# Patient Record
Sex: MALE | Race: WHITE | Employment: FULL TIME | URBAN - METROPOLITAN AREA
[De-identification: names, ages, dates, MRNs, and addresses within clinical notes are randomized per-mention and may not be internally consistent; named-entity substitution may affect disease eponyms.]

---

## 2023-03-17 ENCOUNTER — PREP FOR PROCEDURE (OUTPATIENT)
Dept: SURGERY | Age: 48
End: 2023-03-17

## 2023-03-17 ENCOUNTER — HOSPITAL ENCOUNTER (OUTPATIENT)
Dept: MRI IMAGING | Age: 48
Discharge: HOME | End: 2023-03-20

## 2023-03-17 ENCOUNTER — OFFICE VISIT (OUTPATIENT)
Dept: SURGERY | Age: 48
End: 2023-03-17
Payer: COMMERCIAL

## 2023-03-17 VITALS
BODY MASS INDEX: 25.62 KG/M2 | SYSTOLIC BLOOD PRESSURE: 110 MMHG | HEART RATE: 84 BPM | DIASTOLIC BLOOD PRESSURE: 80 MMHG | OXYGEN SATURATION: 98 % | WEIGHT: 179 LBS | HEIGHT: 70 IN

## 2023-03-17 DIAGNOSIS — K80.65 CALCULUS OF GALLBLADDER AND BILE DUCT WITH CHRONIC CHOLECYSTITIS WITH OBSTRUCTION: Primary | ICD-10-CM

## 2023-03-17 DIAGNOSIS — R79.89 ELEVATED LFTS: ICD-10-CM

## 2023-03-17 DIAGNOSIS — K83.8 DILATED CBD, ACQUIRED: ICD-10-CM

## 2023-03-17 DIAGNOSIS — K80.65 CALCULUS OF GALLBLADDER AND BILE DUCT WITH CHRONIC CHOLECYSTITIS WITH OBSTRUCTION: ICD-10-CM

## 2023-03-17 PROBLEM — K80.20 CHOLELITHIASIS: Status: ACTIVE | Noted: 2023-03-17

## 2023-03-17 PROCEDURE — 99204 OFFICE O/P NEW MOD 45 MIN: CPT | Performed by: SURGERY

## 2023-03-17 RX ORDER — SODIUM CHLORIDE 0.9 % (FLUSH) 0.9 %
5-40 SYRINGE (ML) INJECTION PRN
Status: CANCELLED | OUTPATIENT
Start: 2023-03-17

## 2023-03-17 RX ORDER — ARIPIPRAZOLE 5 MG/1
2.5 TABLET ORAL DAILY
COMMUNITY
Start: 2023-02-23

## 2023-03-17 RX ORDER — INDOCYANINE GREEN AND WATER 25 MG
2.5 KIT INJECTION ONCE
Status: CANCELLED | OUTPATIENT
Start: 2023-03-17 | End: 2023-03-17

## 2023-03-17 RX ORDER — SODIUM CHLORIDE 0.9 % (FLUSH) 0.9 %
5-40 SYRINGE (ML) INJECTION EVERY 12 HOURS SCHEDULED
Status: CANCELLED | OUTPATIENT
Start: 2023-03-17

## 2023-03-17 RX ORDER — SODIUM CHLORIDE 9 MG/ML
INJECTION, SOLUTION INTRAVENOUS PRN
Status: CANCELLED | OUTPATIENT
Start: 2023-03-17

## 2023-03-17 RX ORDER — SERTRALINE HYDROCHLORIDE 100 MG/1
TABLET, FILM COATED ORAL
COMMUNITY
Start: 2023-02-25

## 2023-03-17 NOTE — PROGRESS NOTES
[address]  [phone]    Office Note/Consult Note   Zaheer Nichols   MRN: [id number]     : 1975        HPI: Zaheer Nichols is a 49 y.o. male who is seen in the office on 3/17/2023, on urgent referral from Dr. Irving Chavez for second opinion and evaluation of cholelithiasis with h/o intermittent epigastric pain. He was originally evaluated in late February by his PCP. He was having epigastric pain at the base of his breast bone. It had been occurring on and off for 1-2 years every few months. Intense but goes away. It would hurt so bad he would be curled up in a fetal position and sweat. Some episodes have been severe. It is happening more frequently. No associations. It can happen anytime but not associated with any activity or meal. It can awaken him at night. No CP or SOB. No worse with activity. He states he does feel exhausted when it happens. Eats OK. BM are normal.  Father had to have total gastrectomy years ago. Patient has had a normal colonoscopy and EGD a couple of years ago. One spot with out radiation and it is a sharp burning pain. He no longer has acid reflux symptoms. It seems unassociated with that. No increased pain with exercise, taking deep breath or movement. He has had evaluation with blood work and abdominal ultrasound. At that visit he had blood work that showed elevated transaminases that had been previously normal which returned to near normal 3 weeks later. Ultrasound identified cholelithiasis without mention of gallbladder wall thickness or other signs of cholecystitis. They identified some abnormalities of the common duct and possible changes consistent with mild pancreatitis. At the same time his lipase level was nonelevated, and amylase was mildly elevated. He was given referrals to GI and general surgery. He has yet to be seen by GI.   He was seen by general surgery who recommended cholecystectomy with intraoperative cholangiogram.  Of note, he mentions intermittent episodes of brown urine, acholic stools even as recent as earlier this week. He has no history of hepatitis. Lab work showed normal bilirubin and urinalysis showed no evidence of bile within the urine. He was set up to undergo cholecystectomy in [place], but prefers to have his surgery performed in [place] while also obtaining a second opinion. He has no history of prior abdominal surgeries. He does have a history of pneumothorax treated with tube thoracostomy. He lives near [facility], but works as a  in the [place] area. Past Medical History:   Diagnosis Date    Anxiety      History reviewed. No pertinent surgical history. Current Outpatient Medications   Medication Sig    sertraline (ZOLOFT) 100 MG tablet TAKE 1 TABLET BY MOUTH EVERY DAY    ARIPiprazole (ABILIFY) 5 MG tablet Take 2.5 mg by mouth daily     No current facility-administered medications for this visit. ALLERGIES:  Patient has no known allergies. Social History     Socioeconomic History    Marital status:      Spouse name: None    Number of children: None    Years of education: None    Highest education level: None   Tobacco Use    Smoking status: Never    Smokeless tobacco: Never     Social History     Tobacco Use   Smoking Status Never   Smokeless Tobacco Never     History reviewed. No pertinent family history. ROS: The patient has no difficulty with chest pain or shortness of breath. No fever or chills. The patient denies any personal or family history of abnormal clotting or bleeding. Comprehensive review of systems was otherwise unremarkable except as noted above. Physical Exam:   Constitutional: Alert oriented cooperative patient in no acute distress. /80   Pulse 84   Ht 5' 10\" (1.778 m)   Wt 179 lb (81.2 kg)   SpO2 98%   BMI 25.68 kg/m²   Eyes:Sclera are clear without icterus. ENMT: no obvious neck masses, no ear or lip lesions  CV: RRR. Normal perfusion  Resp: No JVD. Breathing is  non-labored. GI: soft and non-distended, nontender, no palpable RUQ, LUQ or epigastric mass, negative Velasquez's sign     Musculoskeletal: unremarkable with normal function. Neuro:  No obvious focal deficits  Psychiatric: normal affect and mood, no memory impairment    No results found for: WBC, WBCLT, HGB, HGBP, HCT, PHCT, PLT, MCV unremarkable    No results found for: NA, K, CL, CO2, BUN, CREATININE, GLUCOSE, CALCIUM, PROT, LABALBU, BILITOT, ALKPHOS, AST, ALT, LABGLOM, GFRAA, AGRATIO, GLOB unremarkable except as shown below      No results found for: AMYLASE 184 on 3/14/2023  No results found for: LIPASE 35 nonelevated on 3/16/2023    LIVER STUDIES: 3/13/2023 and 2/21/2023        US ABDOMEN  RIGHT UPPER QUADRANT  3/2/2023 @[facility]    REASON FOR EXAM:   Right upper quadrant pain     TECHNIQUE:   Sonographic imaging through the abdomen. COMPARISON:   None     FINDINGS:   Liver: Unremarkable. Portal vein direction of flow is normal on color Doppler evaluation. Pancreas: Normal size and contour, with increased echogenicity suggesting the possibility of previous pancreatitis. Please correlate clinically. Gallbladder: Cholelithiasis. Negative sonographic Velasquez's sign. Common Duct: No evidence of ductal dilation. Right Kidney: 10.1 cm in length. 2.4 cm central cyst, possibly parapelvic. No hydronephrosis or mass. Aorta/IVC: No seen well enough for a proper diagnostic evaluation. IMPRESSION:     Cholelithiasis. Negative sonographic Velasquez's sign. Questionable previous pancreatitis. Central cyst right kidney. Addendum by Garrett Inman MD on 03/02/2023  2:10 PM EST   ADDENDUM:   There is mild dilatation of the common bile duct, without intrahepatic   biliary ductal dilatation.   There is mild color Doppler activity within   the duct in some of the images, with no convincing evidence of   choledocholithiasis. MRCP may be of help in further evaluation. Assessment/Plan:     Tabitha Ball is a 49 y.o. male who presents to the office on 3/17/2023. His findings are consistent with cholelithiasis with passing of gallstones. He has been recommended to undergo cholecystectomy with intraoperative cholangiogram.  He has been having symptoms for approximately 2 years. Blood work back in 2021 showed elevated alkaline phosphatase with normal bilirubin and transaminases. Blood work in February showed elevation of transaminases which normalized within 4 weeks. Total bilirubin and alkaline phosphatase remain nonelevated. Lipase measurement is nonelevated. 3 days earlier, amylase measurement is mildly elevated. He describes episodes of dark urine and acholic stool. Urinalysis did not identify bilirubinuria. Ultrasound identified cholelithiasis without abnormalities of the gallbladder. There were some abnormalities and mild dilation of the common bile duct. Some changes were identified in the pancreas that could represent prior pancreatitis. He does not appear jaundiced today. Blood work from yesterday shows completely normal LFTs. However, his symptoms of dark urine and acholic stools have been off and on. It is possible that he is having recurrent mild bouts of pancreatitis with periampullary edema responsible for these chemistry and symptom changes. He could also have choledocholithiasis. I recommend that he undergo cholecystectomy. We will obtain an MRI/MRCP today for additional evaluation of his common bile duct and pancreas prior to surgery. He understands that if choledocholithiasis is found, then we may refer him to GI for ERCP and duct clearance. Otherwise we discussed proceeding with multiport robotic assisted cholecystectomy with possible intraoperative cholangiogram, possible open. We will utilize ICG for real-time immunofluorescent cholangiography. He does not have a history of traumatic pneumothorax many years ago. I discussed the patient's condition and treatment options with the patient. I discussed risks of surgery in language the patient could understand including bleeding, infection, bile leak, bile duct injury, retained stones, pancreatitis, hernia, chronic pain, chronic nausea, fecal urgency, chronic diarrhea, food intolerances, need for further surgery, abscess, fistula, SBO, DVT, PE, heart attack, stroke, renal failure, respiratory failure, ventilatory dependence, and death. The patient voiced understanding of all this and all questions were answered. Alternatives to surgery were discussed also and risks of the alternatives. The patient requested that we proceed with surgery. Informed consent was obtained. A copy of this note is sent to the referring physician. Patient Active Problem List    Diagnosis Date Noted    Calculus of gallbladder and bile duct with chronic cholecystitis with obstruction 03/17/2023     Priority: Medium    Dilated cbd, acquired 03/17/2023     Priority: Medium    Elevated LFTs 03/17/2023     Priority: Medium    Cholelithiasis 03/17/2023     Added automatically from request for surgery [id number]          Level of MDM (Based on 2/3 elements below)  Number and Complexity of Problems Addressed Amount and/or Complexity of Data to be Reviewed and Analyzed  *Each unique test, order, or document contributes to the combination of 2 or combination of 3 in Category 1 below. Risk of Complications and/or Morbidity or Mortality of pt Management     18932  80288 SF Minimal  ?1self-limited or minor problem Minimal or none Minimal risk of morbidity from additional diagnostic testing or Rx   81197  13503 Low Low  ? 2or more self-limited or minor problems;    or  ? 1stable chronic illness;    or  ?1acute, uncomplicated illness or injury   Limited  (Must meet the requirements of at least 1 of the 2 categories)  Category 1: Tests and documents   ? Any combination of 2 from the following:  ?Review of prior external note(s) from each unique source*;  ?review of the result(s) of each unique test*;   ?ordering of each unique test*    or   Category 2: Assessment requiring an independent historian(s)  (For the categories of independent interpretation of tests and discussion of management or test interpretation, see moderate or high) Low risk of morbidity from additional diagnostic testing or treatment     41852  89045 Mod Moderate  ? 1or more chronic illnesses with exacerbation, progression, or side effects of treatment;    or  ?2or more stable chronic illnesses;    or      1undiagnosed new problem with uncertain prognosis;    or  ?1acute illness with systemic symptoms;    or  ?1acute complicated injury   Moderate:  (Must meet the requirements of 1 out of 3 categories)    Category 1: Tests, documents, or independent historian(s)  ? Any combination of 3 from the following:   ?Review of prior external note(s) from each unique source*;  ?Review of the result(s) of each unique test*;  ?Ordering of each unique test*;  ?Assessment requiring an independent historian(s)    or  Category 2: Independent interpretation of tests   ? Independent interpretation of a test performed by another physician/other qualified health care professional (not separately reported);     or  Category 3: Discussion of management or test interpretation  ? Discussion of management or test interpretation with external physician/other qualified health care professional/appropriate source (not separately reported)   Moderate risk of morbidity from additional diagnostic testing or treatment  Examples only: ? ? Prescription drug management - including advanced wound care prescription wound care products  (eg Iodosorb, hydrofera, silvadene, collagen, puracol plus, optiloc, biologics - placenta, Theraskin, Apligraf).   Note also that if home health care is involved, they will not apply topical therapies without a prescription/order from physician    ? Decision regarding minor surgery with identified patient or procedure risk factors  ? Decision regarding elective major surgery without identified patient or procedure risk factors   ? Diagnosis or treatment significantly limited by social determinants of health       08119  52952 High High  ? 1or more chronic illnesses with severe exacerbation, progression, or side effects of treatment;    or  ?1 acute or chronic illness or injury that poses a threat to life or bodily function   Extensive  (Must meet the requirements of at least 2 out of 3 categories)    Category 1: Tests, documents, or independent historian(s)  ? Any combination of 3 from the following:   ?Review of prior external note(s) from each unique source*;  ?Review of the result(s) of each unique test*;   ?Ordering of each unique test*;   ?Assessment requiring an independent historian(s)    or   Category 2: Independent interpretation of tests   ? Independent interpretation of a test performed by another physician/other qualified health care professional (not separately reported);     or  Category 3: Discussion of management or test interpretation  ? Discussion of management or test interpretation with external physician/other qualified health care professional/appropriate source (not separately reported)   High risk of morbidity from additional diagnostic testing or treatment  Examples only:  ?Drug therapy requiring intensive monitoring for toxicity  ? Decision regarding elective major surgery with identified patient or procedure risk factors  ? Decision regarding emergency major surgery  ? Decision regarding hospitalization  ? Decision not to resuscitate or to de-escalate care because of poor prognosis      Parenteral controlled substances             Gin Otoole MD,  FACS

## 2023-03-17 NOTE — PROGRESS NOTES
Patient verified name and . Order for consent not found in EHR and matches case posting; patient verifies procedure. Type 2 surgery, phone assessment complete. Orders not received. Labs per surgeon: none  Labs per anesthesia protocol: Hgb    Patient answered medical/surgical history questions at their best of ability. All prior to admission medications documented in EPIC. Patient instructed to take the following medications the day of surgery according to anesthesia guidelines with a small sip of water: Zoloft, Abilify On the day before surgery please take Acetaminophen 1000mg in the morning and then again before bed. You may substitute for Tylenol 650 mg. Hold all vitamins 7 days prior to surgery and NSAIDS 5 days prior to surgery. Prescription meds to hold:none  Patient instructed on the following:    > Arrive at main Entrance, time of arrival to be called the day before by 1700  > NPO after midnight, unless otherwise indicated, including gum, mints, and ice chips  > Responsible adult must drive patient to the hospital, stay during surgery, and patient will need supervision 24 hours after anesthesia  > Use Dial in shower the night before surgery and on the morning of surgery  > All piercings must be removed prior to arrival.    > Leave all valuables (money and jewelry) at home but bring insurance card and ID on DOS.   > You may be required to pay a deductible or co-pay on the day of your procedure. You can pre-pay by calling [phone] if your surgery is at the [facility] or [phone] if your surgery is at the [facility]. > Do not wear make-up, nail polish, lotions, cologne, perfumes, powders, or oil on skin. Artificial nails are not permitted.

## 2023-04-04 DIAGNOSIS — K80.65 CALCULUS OF GALLBLADDER AND BILE DUCT WITH CHRONIC CHOLECYSTITIS WITH OBSTRUCTION: Primary | ICD-10-CM

## 2023-04-05 ENCOUNTER — ANESTHESIA (OUTPATIENT)
Dept: SURGERY | Age: 48
End: 2023-04-05
Payer: COMMERCIAL

## 2023-04-05 ENCOUNTER — CLINICAL DOCUMENTATION (OUTPATIENT)
Dept: SURGERY | Age: 48
End: 2023-04-05

## 2023-04-05 ENCOUNTER — HOSPITAL ENCOUNTER (OUTPATIENT)
Age: 48
Setting detail: OUTPATIENT SURGERY
Discharge: HOME | End: 2023-04-05
Attending: SURGERY | Admitting: SURGERY
Payer: COMMERCIAL

## 2023-04-05 ENCOUNTER — APPOINTMENT (OUTPATIENT)
Dept: GENERAL RADIOLOGY | Age: 48
End: 2023-04-05
Attending: SURGERY
Payer: COMMERCIAL

## 2023-04-05 ENCOUNTER — ANESTHESIA EVENT (OUTPATIENT)
Dept: SURGERY | Age: 48
End: 2023-04-05
Payer: COMMERCIAL

## 2023-04-05 VITALS
RESPIRATION RATE: 16 BRPM | TEMPERATURE: 97.6 F | HEIGHT: 70 IN | SYSTOLIC BLOOD PRESSURE: 149 MMHG | WEIGHT: 179.5 LBS | HEART RATE: 77 BPM | BODY MASS INDEX: 25.7 KG/M2 | OXYGEN SATURATION: 96 % | DIASTOLIC BLOOD PRESSURE: 85 MMHG

## 2023-04-05 DIAGNOSIS — K80.67 CALCULUS OF GALLBLADDER AND BILE DUCT WITH ACUTE ON CHRONIC CHOLECYSTITIS, WITH OBSTRUCTION: Primary | ICD-10-CM

## 2023-04-05 DIAGNOSIS — K80.65 CALCULUS OF GALLBLADDER AND BILE DUCT WITH CHRONIC CHOLECYSTITIS WITH OBSTRUCTION: Primary | ICD-10-CM

## 2023-04-05 DIAGNOSIS — K80.65 CALCULUS OF GALLBLADDER AND BILE DUCT WITH CHRONIC CHOLECYSTITIS WITH OBSTRUCTION: ICD-10-CM

## 2023-04-05 PROCEDURE — 7100000001 HC PACU RECOVERY - ADDTL 15 MIN: Performed by: SURGERY

## 2023-04-05 PROCEDURE — 6370000000 HC RX 637 (ALT 250 FOR IP): Performed by: ANESTHESIOLOGY

## 2023-04-05 PROCEDURE — 2500000003 HC RX 250 WO HCPCS: Performed by: STUDENT IN AN ORGANIZED HEALTH CARE EDUCATION/TRAINING PROGRAM

## 2023-04-05 PROCEDURE — 6360000002 HC RX W HCPCS: Performed by: STUDENT IN AN ORGANIZED HEALTH CARE EDUCATION/TRAINING PROGRAM

## 2023-04-05 PROCEDURE — 6360000002 HC RX W HCPCS: Performed by: ANESTHESIOLOGY

## 2023-04-05 PROCEDURE — 7100000000 HC PACU RECOVERY - FIRST 15 MIN: Performed by: SURGERY

## 2023-04-05 PROCEDURE — S2900 ROBOTIC SURGICAL SYSTEM: HCPCS | Performed by: SURGERY

## 2023-04-05 PROCEDURE — 3600000019 HC SURGERY ROBOT ADDTL 15MIN: Performed by: SURGERY

## 2023-04-05 PROCEDURE — 7100000011 HC PHASE II RECOVERY - ADDTL 15 MIN: Performed by: SURGERY

## 2023-04-05 PROCEDURE — 2709999900 HC NON-CHARGEABLE SUPPLY: Performed by: SURGERY

## 2023-04-05 PROCEDURE — 2580000003 HC RX 258: Performed by: STUDENT IN AN ORGANIZED HEALTH CARE EDUCATION/TRAINING PROGRAM

## 2023-04-05 PROCEDURE — 7100000010 HC PHASE II RECOVERY - FIRST 15 MIN: Performed by: SURGERY

## 2023-04-05 PROCEDURE — 74300 X-RAY BILE DUCTS/PANCREAS: CPT

## 2023-04-05 PROCEDURE — 2580000003 HC RX 258: Performed by: ANESTHESIOLOGY

## 2023-04-05 PROCEDURE — 3600000009 HC SURGERY ROBOT BASE: Performed by: SURGERY

## 2023-04-05 PROCEDURE — 3700000000 HC ANESTHESIA ATTENDED CARE: Performed by: SURGERY

## 2023-04-05 PROCEDURE — 2500000003 HC RX 250 WO HCPCS: Performed by: SURGERY

## 2023-04-05 PROCEDURE — 88304 TISSUE EXAM BY PATHOLOGIST: CPT

## 2023-04-05 PROCEDURE — 3700000001 HC ADD 15 MINUTES (ANESTHESIA): Performed by: SURGERY

## 2023-04-05 PROCEDURE — 6360000002 HC RX W HCPCS: Performed by: NURSE ANESTHETIST, CERTIFIED REGISTERED

## 2023-04-05 PROCEDURE — 6360000002 HC RX W HCPCS: Performed by: SURGERY

## 2023-04-05 RX ORDER — ONDANSETRON 2 MG/ML
INJECTION INTRAMUSCULAR; INTRAVENOUS PRN
Status: DISCONTINUED | OUTPATIENT
Start: 2023-04-05 | End: 2023-04-05 | Stop reason: SDUPTHER

## 2023-04-05 RX ORDER — SODIUM CHLORIDE 9 MG/ML
INJECTION, SOLUTION INTRAVENOUS PRN
Status: DISCONTINUED | OUTPATIENT
Start: 2023-04-05 | End: 2023-04-05 | Stop reason: HOSPADM

## 2023-04-05 RX ORDER — OXYCODONE HYDROCHLORIDE 5 MG/1
5 TABLET ORAL PRN
Status: COMPLETED | OUTPATIENT
Start: 2023-04-05 | End: 2023-04-05

## 2023-04-05 RX ORDER — LIDOCAINE HYDROCHLORIDE 20 MG/ML
INJECTION, SOLUTION EPIDURAL; INFILTRATION; INTRACAUDAL; PERINEURAL PRN
Status: DISCONTINUED | OUTPATIENT
Start: 2023-04-05 | End: 2023-04-05 | Stop reason: SDUPTHER

## 2023-04-05 RX ORDER — SODIUM CHLORIDE 0.9 % (FLUSH) 0.9 %
5-40 SYRINGE (ML) INJECTION EVERY 12 HOURS SCHEDULED
Status: DISCONTINUED | OUTPATIENT
Start: 2023-04-05 | End: 2023-04-05 | Stop reason: HOSPADM

## 2023-04-05 RX ORDER — OXYCODONE HYDROCHLORIDE 5 MG/1
10 TABLET ORAL PRN
Status: COMPLETED | OUTPATIENT
Start: 2023-04-05 | End: 2023-04-05

## 2023-04-05 RX ORDER — NEOSTIGMINE METHYLSULFATE 1 MG/ML
INJECTION, SOLUTION INTRAVENOUS PRN
Status: DISCONTINUED | OUTPATIENT
Start: 2023-04-05 | End: 2023-04-05 | Stop reason: SDUPTHER

## 2023-04-05 RX ORDER — ROCURONIUM BROMIDE 10 MG/ML
INJECTION, SOLUTION INTRAVENOUS PRN
Status: DISCONTINUED | OUTPATIENT
Start: 2023-04-05 | End: 2023-04-05 | Stop reason: SDUPTHER

## 2023-04-05 RX ORDER — HYDROMORPHONE HCL 110MG/55ML
PATIENT CONTROLLED ANALGESIA SYRINGE INTRAVENOUS PRN
Status: DISCONTINUED | OUTPATIENT
Start: 2023-04-05 | End: 2023-04-05 | Stop reason: SDUPTHER

## 2023-04-05 RX ORDER — HYDROMORPHONE HYDROCHLORIDE 2 MG/ML
0.5 INJECTION, SOLUTION INTRAMUSCULAR; INTRAVENOUS; SUBCUTANEOUS EVERY 10 MIN PRN
Status: DISCONTINUED | OUTPATIENT
Start: 2023-04-05 | End: 2023-04-05 | Stop reason: HOSPADM

## 2023-04-05 RX ORDER — ONDANSETRON 4 MG/1
4 TABLET, ORALLY DISINTEGRATING ORAL EVERY 6 HOURS PRN
Qty: 30 TABLET | Refills: 1 | Status: SHIPPED | OUTPATIENT
Start: 2023-04-05

## 2023-04-05 RX ORDER — PROPOFOL 10 MG/ML
INJECTION, EMULSION INTRAVENOUS PRN
Status: DISCONTINUED | OUTPATIENT
Start: 2023-04-05 | End: 2023-04-05 | Stop reason: SDUPTHER

## 2023-04-05 RX ORDER — ONDANSETRON 2 MG/ML
4 INJECTION INTRAMUSCULAR; INTRAVENOUS
Status: COMPLETED | OUTPATIENT
Start: 2023-04-05 | End: 2023-04-05

## 2023-04-05 RX ORDER — HYDROMORPHONE HYDROCHLORIDE 2 MG/ML
0.25 INJECTION, SOLUTION INTRAMUSCULAR; INTRAVENOUS; SUBCUTANEOUS EVERY 5 MIN PRN
Status: DISCONTINUED | OUTPATIENT
Start: 2023-04-05 | End: 2023-04-05 | Stop reason: HOSPADM

## 2023-04-05 RX ORDER — MIDAZOLAM HYDROCHLORIDE 2 MG/2ML
2 INJECTION, SOLUTION INTRAMUSCULAR; INTRAVENOUS
Status: COMPLETED | OUTPATIENT
Start: 2023-04-05 | End: 2023-04-05

## 2023-04-05 RX ORDER — HYDROCODONE BITARTRATE AND ACETAMINOPHEN 7.5; 325 MG/1; MG/1
1 TABLET ORAL EVERY 6 HOURS PRN
Qty: 20 TABLET | Refills: 0 | Status: SHIPPED | OUTPATIENT
Start: 2023-04-05 | End: 2023-04-10

## 2023-04-05 RX ORDER — DIPHENHYDRAMINE HYDROCHLORIDE 50 MG/ML
12.5 INJECTION INTRAMUSCULAR; INTRAVENOUS
Status: DISCONTINUED | OUTPATIENT
Start: 2023-04-05 | End: 2023-04-05 | Stop reason: HOSPADM

## 2023-04-05 RX ORDER — EPHEDRINE SULFATE/0.9% NACL/PF 50 MG/5 ML
SYRINGE (ML) INTRAVENOUS PRN
Status: DISCONTINUED | OUTPATIENT
Start: 2023-04-05 | End: 2023-04-05 | Stop reason: SDUPTHER

## 2023-04-05 RX ORDER — FENTANYL CITRATE 50 UG/ML
100 INJECTION, SOLUTION INTRAMUSCULAR; INTRAVENOUS
Status: DISCONTINUED | OUTPATIENT
Start: 2023-04-05 | End: 2023-04-05 | Stop reason: HOSPADM

## 2023-04-05 RX ORDER — DEXAMETHASONE SODIUM PHOSPHATE 4 MG/ML
INJECTION, SOLUTION INTRA-ARTICULAR; INTRALESIONAL; INTRAMUSCULAR; INTRAVENOUS; SOFT TISSUE PRN
Status: DISCONTINUED | OUTPATIENT
Start: 2023-04-05 | End: 2023-04-05 | Stop reason: SDUPTHER

## 2023-04-05 RX ORDER — INDOCYANINE GREEN AND WATER 25 MG
2.5 KIT INJECTION ONCE
Status: COMPLETED | OUTPATIENT
Start: 2023-04-05 | End: 2023-04-05

## 2023-04-05 RX ORDER — TRAMADOL HYDROCHLORIDE 50 MG/1
50 TABLET ORAL EVERY 4 HOURS PRN
Qty: 18 TABLET | Refills: 0 | Status: SHIPPED | OUTPATIENT
Start: 2023-04-05 | End: 2023-04-05 | Stop reason: CLARIF

## 2023-04-05 RX ORDER — GLYCOPYRROLATE 0.2 MG/ML
INJECTION INTRAMUSCULAR; INTRAVENOUS PRN
Status: DISCONTINUED | OUTPATIENT
Start: 2023-04-05 | End: 2023-04-05 | Stop reason: SDUPTHER

## 2023-04-05 RX ORDER — SODIUM CHLORIDE, SODIUM LACTATE, POTASSIUM CHLORIDE, CALCIUM CHLORIDE 600; 310; 30; 20 MG/100ML; MG/100ML; MG/100ML; MG/100ML
INJECTION, SOLUTION INTRAVENOUS CONTINUOUS
Status: DISCONTINUED | OUTPATIENT
Start: 2023-04-05 | End: 2023-04-05 | Stop reason: HOSPADM

## 2023-04-05 RX ORDER — SODIUM CHLORIDE 0.9 % (FLUSH) 0.9 %
5-40 SYRINGE (ML) INJECTION PRN
Status: DISCONTINUED | OUTPATIENT
Start: 2023-04-05 | End: 2023-04-05 | Stop reason: HOSPADM

## 2023-04-05 RX ORDER — ACETAMINOPHEN 500 MG
1000 TABLET ORAL ONCE
Status: COMPLETED | OUTPATIENT
Start: 2023-04-05 | End: 2023-04-05

## 2023-04-05 RX ADMIN — PROPOFOL 150 MG: 10 INJECTION, EMULSION INTRAVENOUS at 10:45

## 2023-04-05 RX ADMIN — ONDANSETRON 4 MG: 2 INJECTION INTRAMUSCULAR; INTRAVENOUS at 12:28

## 2023-04-05 RX ADMIN — LIDOCAINE HYDROCHLORIDE 100 MG: 20 INJECTION, SOLUTION EPIDURAL; INFILTRATION; INTRACAUDAL; PERINEURAL at 10:45

## 2023-04-05 RX ADMIN — ACETAMINOPHEN 1000 MG: 500 TABLET, FILM COATED ORAL at 09:29

## 2023-04-05 RX ADMIN — FENTANYL CITRATE 50 MCG: 50 INJECTION INTRAMUSCULAR; INTRAVENOUS at 11:01

## 2023-04-05 RX ADMIN — ROCURONIUM BROMIDE 10 MG: 50 INJECTION, SOLUTION INTRAVENOUS at 11:46

## 2023-04-05 RX ADMIN — ROCURONIUM BROMIDE 50 MG: 50 INJECTION, SOLUTION INTRAVENOUS at 10:45

## 2023-04-05 RX ADMIN — Medication 2000 MG: at 10:55

## 2023-04-05 RX ADMIN — PHENYLEPHRINE HYDROCHLORIDE 100 MCG: 10 INJECTION INTRAVENOUS at 11:11

## 2023-04-05 RX ADMIN — PHENYLEPHRINE HYDROCHLORIDE 100 MCG: 10 INJECTION INTRAVENOUS at 11:28

## 2023-04-05 RX ADMIN — MIDAZOLAM 2 MG: 1 INJECTION INTRAMUSCULAR; INTRAVENOUS at 09:43

## 2023-04-05 RX ADMIN — Medication 5 MG: at 11:34

## 2023-04-05 RX ADMIN — DEXAMETHASONE SODIUM PHOSPHATE 10 MG: 4 INJECTION, SOLUTION INTRAMUSCULAR; INTRAVENOUS at 11:15

## 2023-04-05 RX ADMIN — SODIUM CHLORIDE, POTASSIUM CHLORIDE, SODIUM LACTATE AND CALCIUM CHLORIDE: 600; 310; 30; 20 INJECTION, SOLUTION INTRAVENOUS at 09:29

## 2023-04-05 RX ADMIN — ONDANSETRON 4 MG: 2 INJECTION INTRAMUSCULAR; INTRAVENOUS at 14:59

## 2023-04-05 RX ADMIN — HYDROMORPHONE HYDROCHLORIDE 0.4 MG: 2 INJECTION INTRAMUSCULAR; INTRAVENOUS; SUBCUTANEOUS at 13:04

## 2023-04-05 RX ADMIN — FENTANYL CITRATE 50 MCG: 50 INJECTION INTRAMUSCULAR; INTRAVENOUS at 10:45

## 2023-04-05 RX ADMIN — OXYCODONE 10 MG: 5 TABLET ORAL at 13:49

## 2023-04-05 RX ADMIN — INDOCYANINE GREEN AND WATER 2.5 MG: KIT at 09:30

## 2023-04-05 RX ADMIN — GLYCOPYRROLATE 0.8 MG: 0.2 INJECTION INTRAMUSCULAR; INTRAVENOUS at 12:39

## 2023-04-05 RX ADMIN — Medication 5 MG: at 12:39

## 2023-04-05 ASSESSMENT — PAIN - FUNCTIONAL ASSESSMENT
PAIN_FUNCTIONAL_ASSESSMENT: 0-10
PAIN_FUNCTIONAL_ASSESSMENT: 0-10
PAIN_FUNCTIONAL_ASSESSMENT: ADULT NONVERBAL PAIN SCALE (NPVS)
PAIN_FUNCTIONAL_ASSESSMENT: 0-10

## 2023-04-05 ASSESSMENT — PAIN DESCRIPTION - LOCATION: LOCATION: ABDOMEN

## 2023-04-05 ASSESSMENT — PAIN SCALES - GENERAL: PAINLEVEL_OUTOF10: 6

## 2023-04-05 NOTE — DISCHARGE INSTRUCTIONS
experiencing discomfort from urinary retention, please go to the nearest ED. If you have sleep apnea and have a CPAP machine, please use it for all naps and sleeping. Please use caution when taking narcotics and any of your home medications that may cause drowsiness. *  Please give a list of your current medications to your Primary Care Provider. *  Please update this list whenever your medications are discontinued, doses are      changed, or new medications (including over-the-counter products) are added. *  Please carry medication information at all times in case of emergency situations. These are general instructions for a healthy lifestyle:  No smoking/ No tobacco products/ Avoid exposure to second hand smoke  Surgeon General's Warning:  Quitting smoking now greatly reduces serious risk to your health. Obesity, smoking, and sedentary lifestyle greatly increases your risk for illness  A healthy diet, regular physical exercise & weight monitoring are important for maintaining a healthy lifestyle    You may be retaining fluid if you have a history of heart failure or if you experience any of the following symptoms:  Weight gain of 3 pounds or more overnight or 5 pounds in a week, increased swelling in our hands or feet or shortness of breath while lying flat in bed. Please call your doctor as soon as you notice any of these symptoms; do not wait until your next office visit.

## 2023-04-05 NOTE — ANESTHESIA PROCEDURE NOTES
Airway  Date/Time: 4/5/2023 10:52 AM  Urgency: elective    Airway not difficult    General Information and Staff    Patient location during procedure: OR  Resident/CRNA: JESSIE Dunn - CRNA  Performed: resident/CRNA     Indications and Patient Condition  Indications for airway management: anesthesia  Spontaneous Ventilation: absent  Sedation level: deep  Preoxygenated: yes  Patient position: sniffing  MILS not maintained throughout  Mask difficulty assessment: vent by bag mask    Final Airway Details  Final airway type: endotracheal airway      Successful airway: ETT  Cuffed: yes   Successful intubation technique: video laryngoscopy  Facilitating devices/methods: intubating stylet  Endotracheal tube insertion site: oral  Blade: Shira  Blade size: #4  ETT size (mm): 8.0  Cormack-Lehane Classification: grade I - full view of glottis  Placement verified by: chest auscultation and capnometry   Measured from: lips  ETT to lips (cm): 22  Number of attempts at approach: 1  Ventilation between attempts: bag mask  Number of other approaches attempted: 1    Other Attempts  Unsuccessful attempted airways: bag mask  Unsuccessful attempted endotracheal techniques: direct laryngoscopy    Additional Comments  SRNA DL with MAC4l, grade III view, on second attempt SRNA attempted video laryngoscope and successfully placed 8. 0ETT confirmed by etCO2  no
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
## 2023-04-05 NOTE — ANESTHESIA POSTPROCEDURE EVALUATION
Department of Anesthesiology  Postprocedure Note    Patient: Mirta Burrows  MRN: [id number]  YOB: 1975  Date of evaluation: 4/5/2023      Procedure Summary     Date: 04/05/23 Room / Location: [facility] MAIN OR  / [facility] MAIN OR    Anesthesia Start: 1037 Anesthesia Stop: 9565    Procedure: CHOLECYSTECTOMY LAPAROSCOPIC ROBOTIC WITH CHOLANGIOGRAM (Abdomen) Diagnosis:       Cholelithiasis      (Cholelithiasis [K80.20])    Providers: Eunice Swift MD Responsible Provider: Oswaldo Jenkins MD    Anesthesia Type: General ASA Status: 2          Anesthesia Type: General    Jaycee Phase I: Jaycee Score: 9    Jaycee Phase II: Jaycee Score: 10      Anesthesia Post Evaluation    Patient location during evaluation: PACU  Patient participation: complete - patient participated  Level of consciousness: awake and alert  Pain score: 2  Airway patency: patent  Nausea & Vomiting: no nausea  Complications: no  Cardiovascular status: blood pressure returned to baseline and hemodynamically stable  Respiratory status: acceptable  Hydration status: euvolemic  Multimodal analgesia pain management approach  There was medical reason for not using a multimodal analgesia pain management approach.

## 2023-04-05 NOTE — H&P
extracted; small ampulla limited papillotomy size but able to pass 12 mm balloon  Pancreas not evaluated  GB - stones; normal cystic duct    PLAN:  GB surgery  F/U prn    Roby Herzog MD       Assessment/Plan:     Nabil Coates is a 51 y.o. male who presents to the office on 3/17/2023. His findings are consistent with cholelithiasis with passing of gallstones. He has been recommended to undergo cholecystectomy with intraoperative cholangiogram.  He has been having symptoms for approximately 2 years. Blood work back in 2021 showed elevated alkaline phosphatase with normal bilirubin and transaminases. Blood work in February showed elevation of transaminases which normalized within 4 weeks. Total bilirubin and alkaline phosphatase remain nonelevated. Lipase measurement is nonelevated. 3 days earlier, amylase measurement is mildly elevated. He describes episodes of dark urine and acholic stool. Urinalysis did not identify bilirubinuria. Ultrasound identified cholelithiasis without abnormalities of the gallbladder. There were some abnormalities and mild dilation of the common bile duct. Some changes were identified in the pancreas that could represent prior pancreatitis. He does not appear jaundiced today. Blood work from yesterday shows completely normal LFTs. However, his symptoms of dark urine and acholic stools have been off and on. It is possible that he is having recurrent mild bouts of pancreatitis with periampullary edema responsible for these chemistry and symptom changes. He could also have choledocholithiasis. I recommend that he undergo cholecystectomy. We will obtain an MRI/MRCP today for additional evaluation of his common bile duct and pancreas prior to surgery. He understands that if choledocholithiasis is found, then we may refer him to GI for ERCP and duct clearance. MRCP was obtained which identified choledocholithiasis.   He underwent ERCP with stone extraction and

## 2023-04-05 NOTE — PERIOP NOTE
Patient has spoken with GALDINO WRIGHT. Consent has been verified, signed and witnessed by this RN. 2 mg of Versed given SIVP per orders. Pulse ox monitor in place & tracing appropriately. Bed in low, locked position with side rails up x 2 and call light in reach. Instructed pt to call with any needs and to remain in bed. Verbalizes understanding. Wife at bedside.

## 2023-04-05 NOTE — BRIEF OP NOTE
Brief Postoperative Note      Patient: Wiley Arroyo  YOB: 1975  MRN: [id number]    Date of Procedure: 4/5/2023    Pre-Op Diagnosis: Cholelithiasis with chronic cholecystitis and choledocholithiasis s/p ERCP with CBD stone extraction    Post-Op Diagnosis: Same and acute cholecystitis with obstruction of cystic duct       Procedure(s):  MULTIPORT ROBOTIC LAPAROSCOPIC CHOLECYSTECTOMY WITH INTRAOPERATIVE CHOLANGIOGRAM AND ICG CHOLANGIOGRAPHY    Surgeon(s):  Kristin Richmond MD    Assistant:  * No surgical staff found *    Anesthesia: General    Estimated Blood Loss (mL): Minimal    Complications: None    Specimens:   ID Type Source Tests Collected by Time Destination   A : gallbladder Tissue Gallbladder SURGICAL PATHOLOGY Kristin Richmond MD 4/5/2023 1110        Implants:  * No implants in log *      Drains:   [REMOVED] Urinary Catheter 04/05/23 2 Way; Stock (Removed)       Findings: Mild hepatic steatosis, otherwise normal appearing liver. Very pale GB without uptake of ICG. Cystic duct also without uptake of ICG. Very edematous tissues c/w acute inflammation on top of chronic inflammation. Critical view obtained. CBD/CHD well seen with ICG. No accessory ducts identified. Cystic duct opened and no backflow of bile/ICG. Cystic duct cannulated and saline flush cleared duct producing backflow of bile and ICG. Cholangiogram obtained showing prompt flow of contrast into duodenum via sphincterotomy. Filling defects of either small stones or air bubbles. Flow into hepatic ducts identified but could not fill due to sphincterotomy and 10 degrees of reverse Trendelenburg. Cholecystectomy completed with very thick GB wall and edema. GB opened on back table revealing many medium and large stones, pale bile, thickened and edematous GB wall, normal anatomy. M-close x 2.     Electronically signed by Kristin Richmond MD on 4/5/2023 at 1:28 PM

## 2023-04-07 NOTE — OP NOTE
cholecystectomy. TECHNIQUE: The cystic duct has been cannulated and contrast injected. Dynamic images are reviewed. FINDINGS: Small intraluminal filling defects within the common bile duct identified on initial images. Intrahepatic biliary grossly normal caliber. Contrast flows freely into the duodenum     Impression   Small intraluminal filling defects, gas bubbles or small stones. Detailed Description of Procedure: The risks, benefits, potential complications, treatment options, and expected outcomes were discussed with the patient pre-operatively. The patient voiced understanding and gave informed consent preoperatively. The patient was taken to the Operating Room, and the [facility] time-out protocol checklist was followed. After the induction of adequate anesthesia, the abdomen was prepped and draped in the usual sterile fashion. Preoperative antibiotics were given. An orogastric tube was in place. Stock catheter was in place. Both arms were out. After infiltration with local anesthetic, a supraumbilical 8 mm vertical incision was created and the abdominal wall elevated to allow entry into the abdominal cavity with Veress needle. Confirmation of placement within the peritoneal cavity was performed using saline drop test.  CO2 pneumoperitoneum was insufflated at 1 L/min with an opening pressure of 0 to a pressure limit of 15 mmHg. Using a 8 mm daVinci Optiview port with the 0 degree 5 mm laparoscope, first trocar access was obtained in the left abdomen. Laparoscopic confirmation showed no deep injury or adhesions. There was no bleeding. Other robotic ports were placed. The same procedure was followed for each port with infiltration of local anesthetic, incision corresponding to size of the trocar and placement of the trocar under laparoscopic visualization. A 8 mm trocar was placed supraumbilically, 2 x 8 mm trocars were placed in the left lateral abdomen approximately 5 cm apart.   A

## 2023-04-10 DIAGNOSIS — K80.65 CALCULUS OF GALLBLADDER AND BILE DUCT WITH CHRONIC CHOLECYSTITIS WITH OBSTRUCTION: ICD-10-CM

## 2023-04-10 LAB
ALBUMIN SERPL-MCNC: 3.6 G/DL (ref 3.5–5)
ALBUMIN/GLOB SERPL: 0.8 (ref 0.4–1.6)
ALP SERPL-CCNC: 134 U/L (ref 50–136)
ALT SERPL-CCNC: 46 U/L (ref 12–65)
ANION GAP SERPL CALC-SCNC: NORMAL MMOL/L (ref 2–11)
AST SERPL-CCNC: 23 U/L (ref 15–37)
BASOPHILS # BLD: 0 K/UL (ref 0–0.2)
BASOPHILS NFR BLD: 1 % (ref 0–2)
BILIRUB SERPL-MCNC: 0.2 MG/DL (ref 0.2–1.1)
BUN SERPL-MCNC: 10 MG/DL (ref 6–23)
CALCIUM SERPL-MCNC: 9.2 MG/DL (ref 8.3–10.4)
CHLORIDE SERPL-SCNC: 105 MMOL/L (ref 101–110)
CO2 SERPL-SCNC: 32 MMOL/L (ref 21–32)
CREAT SERPL-MCNC: 0.8 MG/DL (ref 0.8–1.5)
DIFFERENTIAL METHOD BLD: NORMAL
EOSINOPHIL # BLD: 0.2 K/UL (ref 0–0.8)
EOSINOPHIL NFR BLD: 3 % (ref 0.5–7.8)
ERYTHROCYTE [DISTWIDTH] IN BLOOD BY AUTOMATED COUNT: 13 % (ref 11.9–14.6)
GLOBULIN SER CALC-MCNC: 4.3 G/DL (ref 2.8–4.5)
GLUCOSE SERPL-MCNC: 83 MG/DL (ref 65–100)
HCT VFR BLD AUTO: 44.4 % (ref 41.1–50.3)
HGB BLD-MCNC: 14.6 G/DL (ref 13.6–17.2)
IMM GRANULOCYTES # BLD AUTO: 0.1 K/UL (ref 0–0.5)
IMM GRANULOCYTES NFR BLD AUTO: 1 % (ref 0–5)
LYMPHOCYTES # BLD: 1.5 K/UL (ref 0.5–4.6)
LYMPHOCYTES NFR BLD: 19 % (ref 13–44)
MCH RBC QN AUTO: 28 PG (ref 26.1–32.9)
MCHC RBC AUTO-ENTMCNC: 32.9 G/DL (ref 31.4–35)
MCV RBC AUTO: 85.1 FL (ref 82–102)
MONOCYTES # BLD: 0.5 K/UL (ref 0.1–1.3)
MONOCYTES NFR BLD: 6 % (ref 4–12)
NEUTS SEG # BLD: 5.6 K/UL (ref 1.7–8.2)
NEUTS SEG NFR BLD: 70 % (ref 43–78)
NRBC # BLD: 0 K/UL (ref 0–0.2)
PLATELET # BLD AUTO: 286 K/UL (ref 150–450)
PMV BLD AUTO: 9.6 FL (ref 9.4–12.3)
POTASSIUM SERPL-SCNC: 4.1 MMOL/L (ref 3.5–5.1)
PROT SERPL-MCNC: 7.9 G/DL (ref 6.3–8.2)
RBC # BLD AUTO: 5.22 M/UL (ref 4.23–5.6)
SODIUM SERPL-SCNC: 136 MMOL/L (ref 133–143)
WBC # BLD AUTO: 7.9 K/UL (ref 4.3–11.1)

## 2023-04-18 ENCOUNTER — OFFICE VISIT (OUTPATIENT)
Dept: SURGERY | Age: 48
End: 2023-04-18

## 2023-04-18 VITALS — BODY MASS INDEX: 25.91 KG/M2 | WEIGHT: 181 LBS | HEIGHT: 70 IN

## 2023-04-18 DIAGNOSIS — R79.89 ELEVATED LFTS: ICD-10-CM

## 2023-04-18 DIAGNOSIS — K83.8 DILATED CBD, ACQUIRED: ICD-10-CM

## 2023-04-18 DIAGNOSIS — K80.67 CALCULUS OF GALLBLADDER AND BILE DUCT WITH ACUTE ON CHRONIC CHOLECYSTITIS, WITH OBSTRUCTION: Primary | ICD-10-CM

## 2023-04-18 PROCEDURE — 99024 POSTOP FOLLOW-UP VISIT: CPT | Performed by: SURGERY

## 2025-07-30 ENCOUNTER — APPOINTMENT (OUTPATIENT)
Dept: GENERAL RADIOLOGY | Age: 50
End: 2025-07-30
Payer: COMMERCIAL

## 2025-07-30 ENCOUNTER — APPOINTMENT (OUTPATIENT)
Dept: CT IMAGING | Age: 50
End: 2025-07-30
Payer: COMMERCIAL

## 2025-07-30 ENCOUNTER — HOSPITAL ENCOUNTER (EMERGENCY)
Age: 50
Discharge: HOME | End: 2025-07-30
Payer: COMMERCIAL

## 2025-07-30 VITALS
HEIGHT: 70 IN | BODY MASS INDEX: 30.78 KG/M2 | WEIGHT: 215 LBS | TEMPERATURE: 98.4 F | OXYGEN SATURATION: 96 % | SYSTOLIC BLOOD PRESSURE: 128 MMHG | HEART RATE: 75 BPM | DIASTOLIC BLOOD PRESSURE: 79 MMHG | RESPIRATION RATE: 27 BRPM

## 2025-07-30 DIAGNOSIS — R07.9 CHEST PAIN, UNSPECIFIED TYPE: Primary | ICD-10-CM

## 2025-07-30 LAB
ALBUMIN SERPL-MCNC: 3.3 G/DL (ref 3.5–5)
ALBUMIN/GLOB SERPL: 0.8 (ref 1–1.9)
ALP SERPL-CCNC: 104 U/L (ref 40–129)
ALT SERPL-CCNC: 23 U/L (ref 8–55)
ANION GAP SERPL CALC-SCNC: 12 MMOL/L (ref 7–16)
AST SERPL-CCNC: 22 U/L (ref 15–37)
BASOPHILS # BLD: 0.05 K/UL (ref 0–0.2)
BASOPHILS NFR BLD: 0.6 % (ref 0–2)
BILIRUB SERPL-MCNC: 0.4 MG/DL (ref 0–1.2)
BUN SERPL-MCNC: 10 MG/DL (ref 6–23)
CALCIUM SERPL-MCNC: 9 MG/DL (ref 8.8–10.2)
CHLORIDE SERPL-SCNC: 104 MMOL/L (ref 98–107)
CO2 SERPL-SCNC: 24 MMOL/L (ref 20–29)
CREAT SERPL-MCNC: 0.86 MG/DL (ref 0.8–1.3)
D DIMER PPP FEU-MCNC: 0.41 UG/ML(FEU)
DIFFERENTIAL METHOD BLD: ABNORMAL
EKG ATRIAL RATE: 89 BPM
EKG DIAGNOSIS: NORMAL
EKG P AXIS: 58 DEGREES
EKG P-R INTERVAL: 142 MS
EKG Q-T INTERVAL: 358 MS
EKG QRS DURATION: 84 MS
EKG QTC CALCULATION (BAZETT): 435 MS
EKG R AXIS: 72 DEGREES
EKG T AXIS: 51 DEGREES
EKG VENTRICULAR RATE: 89 BPM
EOSINOPHIL # BLD: 0.1 K/UL (ref 0–0.8)
EOSINOPHIL NFR BLD: 1.2 % (ref 0.5–7.8)
ERYTHROCYTE [DISTWIDTH] IN BLOOD BY AUTOMATED COUNT: 14.8 % (ref 11.9–14.6)
GLOBULIN SER CALC-MCNC: 4.1 G/DL (ref 2.3–3.5)
GLUCOSE SERPL-MCNC: 172 MG/DL (ref 70–99)
HCT VFR BLD AUTO: 41.7 % (ref 41.1–50.3)
HGB BLD-MCNC: 13.1 G/DL (ref 13.6–17.2)
IMM GRANULOCYTES # BLD AUTO: 0.02 K/UL (ref 0–0.5)
IMM GRANULOCYTES NFR BLD AUTO: 0.2 % (ref 0–5)
LYMPHOCYTES # BLD: 1.09 K/UL (ref 0.5–4.6)
LYMPHOCYTES NFR BLD: 13.3 % (ref 13–44)
MCH RBC QN AUTO: 25.3 PG (ref 26.1–32.9)
MCHC RBC AUTO-ENTMCNC: 31.4 G/DL (ref 31.4–35)
MCV RBC AUTO: 80.5 FL (ref 82–102)
MONOCYTES # BLD: 0.39 K/UL (ref 0.1–1.3)
MONOCYTES NFR BLD: 4.8 % (ref 4–12)
NEUTS SEG # BLD: 6.55 K/UL (ref 1.7–8.2)
NEUTS SEG NFR BLD: 79.9 % (ref 43–78)
NRBC # BLD: 0 K/UL (ref 0–0.2)
PLATELET # BLD AUTO: 266 K/UL (ref 150–450)
PMV BLD AUTO: 9.9 FL (ref 9.4–12.3)
POTASSIUM SERPL-SCNC: 3.6 MMOL/L (ref 3.5–5.1)
PROT SERPL-MCNC: 7.3 G/DL (ref 6.3–8.2)
RBC # BLD AUTO: 5.18 M/UL (ref 4.23–5.6)
SODIUM SERPL-SCNC: 139 MMOL/L (ref 136–145)
TROPONIN T SERPL HS-MCNC: <6 NG/L (ref 0–22)
TROPONIN T SERPL HS-MCNC: <6 NG/L (ref 0–22)
WBC # BLD AUTO: 8.2 K/UL (ref 4.3–11.1)

## 2025-07-30 PROCEDURE — 93010 ELECTROCARDIOGRAM REPORT: CPT | Performed by: INTERNAL MEDICINE

## 2025-07-30 PROCEDURE — 85379 FIBRIN DEGRADATION QUANT: CPT

## 2025-07-30 PROCEDURE — 6360000002 HC RX W HCPCS

## 2025-07-30 PROCEDURE — 96374 THER/PROPH/DIAG INJ IV PUSH: CPT

## 2025-07-30 PROCEDURE — 71046 X-RAY EXAM CHEST 2 VIEWS: CPT

## 2025-07-30 PROCEDURE — 6360000004 HC RX CONTRAST MEDICATION

## 2025-07-30 PROCEDURE — 85025 COMPLETE CBC W/AUTO DIFF WBC: CPT

## 2025-07-30 PROCEDURE — 36415 COLL VENOUS BLD VENIPUNCTURE: CPT

## 2025-07-30 PROCEDURE — 93005 ELECTROCARDIOGRAM TRACING: CPT

## 2025-07-30 PROCEDURE — 84484 ASSAY OF TROPONIN QUANT: CPT

## 2025-07-30 PROCEDURE — 99285 EMERGENCY DEPT VISIT HI MDM: CPT

## 2025-07-30 PROCEDURE — 80053 COMPREHEN METABOLIC PANEL: CPT

## 2025-07-30 PROCEDURE — 71260 CT THORAX DX C+: CPT

## 2025-07-30 RX ORDER — KETOROLAC TROMETHAMINE 15 MG/ML
15 INJECTION, SOLUTION INTRAMUSCULAR; INTRAVENOUS ONCE
Status: COMPLETED | OUTPATIENT
Start: 2025-07-30 | End: 2025-07-30

## 2025-07-30 RX ORDER — NAPROXEN 500 MG/1
500 TABLET ORAL 2 TIMES DAILY WITH MEALS
Qty: 14 TABLET | Refills: 0 | Status: SHIPPED | OUTPATIENT
Start: 2025-07-30 | End: 2025-08-06

## 2025-07-30 RX ORDER — IOPAMIDOL 755 MG/ML
75 INJECTION, SOLUTION INTRAVASCULAR
Status: COMPLETED | OUTPATIENT
Start: 2025-07-30 | End: 2025-07-30

## 2025-07-30 RX ORDER — METHOCARBAMOL 750 MG/1
750 TABLET, FILM COATED ORAL 3 TIMES DAILY
Qty: 21 TABLET | Refills: 0 | Status: SHIPPED | OUTPATIENT
Start: 2025-07-30 | End: 2025-08-06

## 2025-07-30 RX ADMIN — KETOROLAC TROMETHAMINE 15 MG: 15 INJECTION, SOLUTION INTRAMUSCULAR; INTRAVENOUS at 08:32

## 2025-07-30 RX ADMIN — IOPAMIDOL 75 ML: 755 INJECTION, SOLUTION INTRAVENOUS at 11:11

## 2025-07-30 ASSESSMENT — ENCOUNTER SYMPTOMS
SHORTNESS OF BREATH: 1
EYES NEGATIVE: 1
GASTROINTESTINAL NEGATIVE: 1
ALLERGIC/IMMUNOLOGIC NEGATIVE: 1

## 2025-07-30 ASSESSMENT — PAIN DESCRIPTION - LOCATION
LOCATION: CHEST
LOCATION: CHEST

## 2025-07-30 ASSESSMENT — PAIN SCALES - GENERAL
PAINLEVEL_OUTOF10: 0
PAINLEVEL_OUTOF10: 7
PAINLEVEL_OUTOF10: 7

## 2025-07-30 ASSESSMENT — LIFESTYLE VARIABLES
HOW OFTEN DO YOU HAVE A DRINK CONTAINING ALCOHOL: NEVER
HOW MANY STANDARD DRINKS CONTAINING ALCOHOL DO YOU HAVE ON A TYPICAL DAY: PATIENT DOES NOT DRINK

## 2025-07-30 NOTE — DISCHARGE INSTRUCTIONS
As we discussed, your CAT scan did not show evidence of blood clot.  There was some nodules that were present in the right middle lobe in which I want you to follow back up with your primary care doctor about in a couple of weeks.  I would also like you to follow-up in a couple weeks to have your symptoms rechecked as I will be sending you home with a course of medications to take over the next 7 days to help with your symptom relief.  I have placed information in your discharge paperwork in regards to home care as well.  As we discussed, please return to the emergency department for any new, worsening, concerning symptoms.
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
## (undated) DEVICE — SUTURE SZ 0 27IN 5/8 CIR UR-6  TAPER PT VIOLET ABSRB VICRYL J603H

## (undated) DEVICE — GAUZE,SPONGE,2"X2",8PLY,STERILE,LF,2'S: Brand: MEDLINE

## (undated) DEVICE — NEEDLE INSUF L120MM ULT VERES ENDOPATH

## (undated) DEVICE — BLADELESS OBTURATOR: Brand: WECK VISTA

## (undated) DEVICE — COVER,TABLE,44X76,STERILE: Brand: MEDLINE

## (undated) DEVICE — [HIGH FLOW INSUFFLATOR,  DO NOT USE IF PACKAGE IS DAMAGED,  KEEP DRY,  KEEP AWAY FROM SUNLIGHT,  PROTECT FROM HEAT AND RADIOACTIVE SOURCES.]: Brand: PNEUMOSURE

## (undated) DEVICE — CANNULA SEAL

## (undated) DEVICE — BLADE CLIPPER GEN PURP NS

## (undated) DEVICE — STERILE LATEX POWDER FREE SURGICAL GLOVES WITH HYDROGEL COATING: Brand: PROTEXIS

## (undated) DEVICE — STRIP,CLOSURE,WOUND,MEDI-STRIP,1/2X4: Brand: MEDLINE

## (undated) DEVICE — SUTURE VCRL SZ 4-0 L27IN ABSRB UD L19MM PS-2 3/8 CIR PRIM J426H

## (undated) DEVICE — 3M™ TEGADERM™ TRANSPARENT FILM DRESSING FRAME STYLE, 1624W, 2-3/8 IN X 2-3/4 IN (6 CM X 7 CM), 100/CT 4CT/CASE: Brand: 3M™ TEGADERM™

## (undated) DEVICE — REDUCER: Brand: ENDOWRIST

## (undated) DEVICE — INTENDED FOR TISSUE SEPARATION, AND OTHER PROCEDURES THAT REQUIRE A SHARP SURGICAL BLADE TO PUNCTURE OR CUT.: Brand: BARD-PARKER ® STAINLESS STEEL BLADES

## (undated) DEVICE — ELECTRO LUBE IS A SINGLE PATIENT USE DEVICE THAT IS INTENDED TO BE USED ON ELECTROSURGICAL ELECTRODES TO REDUCE STICKING.: Brand: KEY SURGICAL ELECTRO LUBE

## (undated) DEVICE — COLUMN DRAPE

## (undated) DEVICE — TIP COVER ACCESSORY

## (undated) DEVICE — MASTISOL ADHESIVE LIQ 2/3ML

## (undated) DEVICE — ARM DRAPE

## (undated) DEVICE — GENERAL LAPAROSCOPY: Brand: MEDLINE INDUSTRIES, INC.

## (undated) DEVICE — KIT SUTURING DEVICE M-CLOSE

## (undated) DEVICE — SOLUTION ANTIFOG VIS SYS CLEARIFY LAPSCP

## (undated) DEVICE — TRAY,URINE METER,100% SILICONE,16FR10ML: Brand: MEDLINE

## (undated) DEVICE — ANCHOR TISSUE RETRIEVAL SYSTEM, BAG SIZE 125 ML, PORT SIZE 8 MM: Brand: ANCHOR TISSUE RETRIEVAL SYSTEM